# Patient Record
Sex: FEMALE | Race: BLACK OR AFRICAN AMERICAN | NOT HISPANIC OR LATINO
[De-identification: names, ages, dates, MRNs, and addresses within clinical notes are randomized per-mention and may not be internally consistent; named-entity substitution may affect disease eponyms.]

---

## 2019-05-16 ENCOUNTER — APPOINTMENT (OUTPATIENT)
Dept: ORTHOPEDIC SURGERY | Facility: CLINIC | Age: 49
End: 2019-05-16
Payer: COMMERCIAL

## 2019-05-16 VITALS — WEIGHT: 145 LBS | HEIGHT: 64 IN | BODY MASS INDEX: 24.75 KG/M2

## 2019-05-16 PROBLEM — Z00.00 ENCOUNTER FOR PREVENTIVE HEALTH EXAMINATION: Status: ACTIVE | Noted: 2019-05-16

## 2019-05-16 PROCEDURE — 20611 DRAIN/INJ JOINT/BURSA W/US: CPT | Mod: LT

## 2019-05-16 PROCEDURE — 99214 OFFICE O/P EST MOD 30 MIN: CPT | Mod: 25

## 2019-05-16 PROCEDURE — 76882 US LMTD JT/FCL EVL NVASC XTR: CPT | Mod: LT,59

## 2019-05-16 NOTE — ASSESSMENT
[FreeTextEntry1] : POST INJECTION INSTRUCTIONS\par \par COLD THERAPY , ANALGESICS PRN\par \par HOME STRETCHING AND EXERCISES QD\par \par MRI IF NO RELIEF\par

## 2019-05-16 NOTE — PHYSICAL EXAM
[de-identified] : PHYSICAL EXAM LEFT  SHOULDER\par \par SCAPULAR PROTRACTION\par AROM 140 / 140 / 80 / 20 \par TENDER: SA REGION /\par \par SPECIAL TESTING :\par PERAZA - POSITIVE \par DARYL - POSITIVE \par SPEED TEST - POSITIVE\par \par PALACIOS - NEGATIVE \par APPREHENSION AND SUPPRESSION - NEGATIVE \par \par RC STRENGTH TESTING \par SS:  5/5\par SUB 5/5\par IS     5/5\par BICEPS  5/5\par \par SENSATION  - GROSSLY INTACT\par \par \par

## 2019-05-16 NOTE — DISCUSSION/SUMMARY
[de-identified] : Patient has a flare up of left subacromial and subdeltoid pain recently because of change in anterior level and other stresses\par \par Previous injection August 2018 provided excellent relief for prolonged period of time\par \par Triamcinolone 10 Mg Injected in Subacromial Space the Left Shoulder Utilizing Ultrasound Guidance\par \par I advised the patient to resume home exercise program scapular retraction exercises and external rotation strengthening\par \par If Symptoms fail to resolve consider MRI

## 2019-05-16 NOTE — HISTORY OF PRESENT ILLNESS
[Pain Location] : pain [] : left shoulder [8] : an average pain level of 8/10 [Stable] : stable [Lifting] : worsened by lifting [Rest] : relieved by rest [de-identified] : FOLLOW UP\par LEFT SHOULDER PAIN\par IMPROVING\par FLARE UP 1-2 WEEKS AGO\par PAIN LEVEL 8/10\par INTERMITTENT PAIN\par WORSE WITH LIFTING, REACHING, NIGHTTIME\par BETTER WITH CORTISONE INJECTION\par STIFFNESS\par RADIATING PAIN

## 2019-06-13 ENCOUNTER — APPOINTMENT (OUTPATIENT)
Dept: ORTHOPEDIC SURGERY | Facility: CLINIC | Age: 49
End: 2019-06-13
Payer: COMMERCIAL

## 2019-06-13 VITALS — WEIGHT: 145 LBS | BODY MASS INDEX: 24.75 KG/M2 | HEIGHT: 64 IN

## 2019-06-13 PROCEDURE — 99214 OFFICE O/P EST MOD 30 MIN: CPT

## 2019-06-13 NOTE — HISTORY OF PRESENT ILLNESS
[de-identified] : PATIENT DESCRIBES TIGHTNESS AND SORE FEELING DEEP IN GLUTE AREA TRAVELING DOWN INTO HAMSTRING. Right-sided with no history of injury.

## 2019-06-13 NOTE — DISCUSSION/SUMMARY
[de-identified] : The patient clinically he seems to have been aggravated/strain of her hamstring origin. She was placed on Naprosyn 500 mg twice a day for 6 days combined with Pepcid to followup in a week if no improvement. The pain persists MRI that may be warranted.

## 2019-06-13 NOTE — PHYSICAL EXAM
[de-identified] : Patient has point tenderness at the origin of her hamstring and the right groin and buttock. Full range of motion of the right hip is noted with no restrictions. There is no atrophy or weakness and right lower extremity. She is neurovascular intact distally.

## 2019-07-19 ENCOUNTER — APPOINTMENT (OUTPATIENT)
Dept: ORTHOPEDIC SURGERY | Facility: CLINIC | Age: 49
End: 2019-07-19
Payer: COMMERCIAL

## 2019-07-19 VITALS — HEIGHT: 64 IN | BODY MASS INDEX: 24.75 KG/M2 | WEIGHT: 145 LBS

## 2019-07-19 DIAGNOSIS — M25.551 PAIN IN RIGHT HIP: ICD-10-CM

## 2019-07-19 DIAGNOSIS — M75.52 BURSITIS OF LEFT SHOULDER: ICD-10-CM

## 2019-07-19 PROCEDURE — 99214 OFFICE O/P EST MOD 30 MIN: CPT

## 2019-07-19 NOTE — HISTORY OF PRESENT ILLNESS
[de-identified] : This patient is a patient of Dr. Pineda. He gave her an injection in early May. She been moving boxes since Memorial Day. The pain is increased since doing that. Her some pain at nighttime. No weakness.

## 2019-07-19 NOTE — DISCUSSION/SUMMARY
[de-identified] : This patient will get an MRI of the right shoulder. I will call her with the results. Most likely she will followup with Dr. Pineda

## 2019-07-19 NOTE — PHYSICAL EXAM
[de-identified] : Left shoulder shows a full range of motion tenderness over the anterior aspect of glenohumeral joint. Positive Neer impingement sign positive Zarco test. No weakness neurovascular exam is normal

## 2019-07-22 ENCOUNTER — TRANSCRIPTION ENCOUNTER (OUTPATIENT)
Age: 49
End: 2019-07-22

## 2019-07-29 ENCOUNTER — APPOINTMENT (OUTPATIENT)
Dept: ORTHOPEDIC SURGERY | Facility: CLINIC | Age: 49
End: 2019-07-29
Payer: COMMERCIAL

## 2019-07-29 PROCEDURE — 99214 OFFICE O/P EST MOD 30 MIN: CPT

## 2019-07-29 NOTE — HISTORY OF PRESENT ILLNESS
[de-identified] : PATIENT FOLLOWING UP FOR RIGHT HIP PAIN AND STIFFNESS - Patient had a recent MRI which shows chronic tendinitis of the hamstring of the right hip

## 2019-07-29 NOTE — PHYSICAL EXAM
[de-identified] : Patient has point tenderness at the origin of her hamstring and the right groin and buttock. Full range of motion of the right hip is noted with no restrictions. There is no atrophy or weakness and right lower extremity. She is neurovascular intact distally.

## 2019-07-29 NOTE — DISCUSSION/SUMMARY
[de-identified] : Patient will be sent twice a week for 5 weeks of physical therapy she'll followup in 5 weeks' time not really improved.

## 2019-08-15 ENCOUNTER — APPOINTMENT (OUTPATIENT)
Dept: ORTHOPEDIC SURGERY | Facility: CLINIC | Age: 49
End: 2019-08-15
Payer: COMMERCIAL

## 2019-08-15 VITALS — HEIGHT: 64 IN | WEIGHT: 145 LBS | BODY MASS INDEX: 24.75 KG/M2

## 2019-08-15 PROCEDURE — 99213 OFFICE O/P EST LOW 20 MIN: CPT

## 2019-08-15 NOTE — PHYSICAL EXAM
[de-identified] : PHYSICAL EXAM LEFT  SHOULDER\par \par SCAPULAR PROTRACTION\par AROM 120 / 110 / 70 / 20\par TENDER: SA REGION \par \par SPECIAL TESTING :\par PERAZA - POSITIVE \par DARYL - POSITIVE \par SPEED TEST - POSITIVE\par \par PALACIOS - NEGATIVE \par APPREHENSION AND SUPPRESSION - NEGATIVE \par \par RC STRENGTH TESTING \par SS:  5/5\par SUB 5/5\par IS     5/5\par BICEPS  5/5\par \par SENSATION  - GROSSLY INTACT\par \par \par

## 2019-08-15 NOTE — HISTORY OF PRESENT ILLNESS
[10] : an average pain level of 10/10 [de-identified] : LEFT SHOULDER\par FOLLOW UP\par PAIN LEVEL 10/10\par WORSE WITH LIFTING, REACHING, NIGHTTIME\par BETTER WITH CORTISONE INJECTION\par STIFFNESS

## 2019-08-15 NOTE — DISCUSSION/SUMMARY
[de-identified] : PATIENT HAS PARTIAL ROTATOR CUFF TEAR WITH EVIDENCE OF IMPINGEMENT.\par I REVIEWED THE MRI CAREFULLY-TEAR APPEARS TO BE A NEAR COMPLETE\par PATIENT HAS BEEN TREATED WITH INJECTIONS AND PHYSICAL THERAPY HAS NEVER HAD LONG-LASTING RELIEF NEW LINE IN FACT MORE RECENTLY PATIENT HAS HAD MUCH WORSENING SYMPTOMS\par \par I RECOMMEND ARTHROSCOPIC EVALUATION SUBACROMIAL DECOMPRESSION AND ACROMIOPLASTY ROTATOR CUFF REPAIR VERSUS REGION THE 10 IMPLANT.\par \par I'VE EXPLAINED TO LIMITATIONS OF EACH PROCEDURE WILL. PATIENT WILL REQUIRE IMMOBILIZATION SLING OR UP TO 6 WEEKS BUT AS PATIENT PHYSICAL THERAPY FOR 12 WEEKS AFTER SLING IMMOBILIZATION IS OVER\par \par PATIENT WISHED TO PROCEED\par \par WE'LL PROCEED WITH AUTHORIZATION SCHEDULING

## 2019-08-27 ENCOUNTER — APPOINTMENT (OUTPATIENT)
Dept: ORTHOPEDIC SURGERY | Facility: AMBULATORY SURGERY CENTER | Age: 49
End: 2019-08-27
Payer: COMMERCIAL

## 2019-08-27 PROCEDURE — 29827 SHO ARTHRS SRG RT8TR CUF RPR: CPT | Mod: LT

## 2019-08-27 PROCEDURE — 29826 SHO ARTHRS SRG DECOMPRESSION: CPT | Mod: LT,59

## 2019-08-27 PROCEDURE — 29823 SHO ARTHRS SRG XTNSV DBRDMT: CPT | Mod: LT,59

## 2019-08-27 PROCEDURE — 29820 SHO ARTHRS SRG PRTL SYNVCT: CPT | Mod: LT,59

## 2019-08-30 ENCOUNTER — APPOINTMENT (OUTPATIENT)
Dept: ORTHOPEDIC SURGERY | Facility: CLINIC | Age: 49
End: 2019-08-30
Payer: COMMERCIAL

## 2019-08-30 VITALS — WEIGHT: 145 LBS | HEIGHT: 64 IN | BODY MASS INDEX: 24.75 KG/M2

## 2019-08-30 DIAGNOSIS — M75.42 IMPINGEMENT SYNDROME OF LEFT SHOULDER: ICD-10-CM

## 2019-08-30 PROCEDURE — 99024 POSTOP FOLLOW-UP VISIT: CPT

## 2019-08-30 PROCEDURE — 73030 X-RAY EXAM OF SHOULDER: CPT | Mod: LT

## 2019-08-30 RX ORDER — OXYCODONE AND ACETAMINOPHEN 7.5; 325 MG/1; MG/1
7.5-325 TABLET ORAL
Qty: 42 | Refills: 0 | Status: ACTIVE | COMMUNITY
Start: 2019-08-15 | End: 1900-01-01

## 2019-08-30 NOTE — HISTORY OF PRESENT ILLNESS
[de-identified] : LEFT SHOULDER \par FOLLOW UP\par POST OP AUGUST 27, 2019 - LEFT DESIREE , REGENETEN PATCH FOR PARTIAL TEAR \par PAIN LEVEL 6/10\par ICE AND MEDICATION AS NEEDED\par SLIGHT SWELLING\par GENERAL WEAKNESS\par SLIGHT NUMBNESS AND TINGLING

## 2019-08-30 NOTE — PHYSICAL EXAM
[de-identified] : POST OP SHOULDER EXAM \par \par PORTALS HEALING WELL - NO ERYTHEMA OR CALOR\par \par AROM  PEND AND EXT ROT \par DISTAL CMS INTACT [de-identified] : LEFT SHOULDER XRAY -  \par SATISFACTORY ACROMIOPLASTY

## 2019-08-30 NOTE — DISCUSSION/SUMMARY
[de-identified] : AUGUST 27, 2019 - LEFT DESIREE , REGENETEN PATCH FOR PARTIAL TEAR \par \par POST OP DESIREE\par \par COLD THERAPY,ANALGESICS AS NEEDED- WEAN NARCOTICS\par \par DISCONTINUE SLING  - DESKTOP WORK ALLOWED\par \par START PENDULUM EXERCISES, AAROM - DEMONSTARTED \par \par RESUME CARDIO 2 WEEKS - NO SHOULDER CHEST WEIGHTS\par \par START PT WITHIMN 7-14 DAY\par OFFICE FU 6 WEEKS\par

## 2019-10-03 ENCOUNTER — APPOINTMENT (OUTPATIENT)
Dept: ORTHOPEDIC SURGERY | Facility: CLINIC | Age: 49
End: 2019-10-03
Payer: COMMERCIAL

## 2019-10-03 VITALS — HEIGHT: 64 IN | BODY MASS INDEX: 24.75 KG/M2 | WEIGHT: 145 LBS

## 2019-10-03 DIAGNOSIS — M75.112 INCOMPLETE ROTATOR CUFF TEAR OR RUPTURE OF LEFT SHOULDER, NOT SPECIFIED AS TRAUMATIC: ICD-10-CM

## 2019-10-03 PROCEDURE — 99024 POSTOP FOLLOW-UP VISIT: CPT

## 2019-10-03 RX ORDER — NAPROXEN AND ESOMEPRAZOLE MAGNESIUM 500; 20 MG/1; MG/1
500-20 TABLET, DELAYED RELEASE ORAL TWICE DAILY
Qty: 60 | Refills: 2 | Status: ACTIVE | COMMUNITY
Start: 2019-10-03 | End: 1900-01-01

## 2019-10-03 NOTE — PHYSICAL EXAM
[de-identified] : POST OP LEFT SHOULDER EXAM - POST OP AUGUST 27, 2019 - LEFT DESIREE , REGENETEN PATCH FOR PARTIAL TEAR \par \par PORTALS HEALING WELL - NO ERYTHEMA OR CALOR\par \par LEFT AROM - 130 / 130 \par DISTAL CMS INTACT

## 2019-10-03 NOTE — HISTORY OF PRESENT ILLNESS
[de-identified] : LEFT SHOULDER \par FOLLOW UP\par POST OP AUGUST 27, 2019 - LEFT DESIREE , REGENETEN PATCH FOR PARTIAL TEAR \par PAIN LEVEL 6-7/10\par WORSE IN THE MORNING\par STIFFNESS, LACK OF ROM\par PT 2X WEEK HELPING\par

## 2019-11-13 ENCOUNTER — APPOINTMENT (OUTPATIENT)
Dept: ORTHOPEDIC SURGERY | Facility: CLINIC | Age: 49
End: 2019-11-13

## 2020-12-11 ENCOUNTER — APPOINTMENT (OUTPATIENT)
Dept: ORTHOPEDIC SURGERY | Facility: CLINIC | Age: 50
End: 2020-12-11
Payer: COMMERCIAL

## 2020-12-11 PROCEDURE — 99072 ADDL SUPL MATRL&STAF TM PHE: CPT

## 2020-12-11 PROCEDURE — 99215 OFFICE O/P EST HI 40 MIN: CPT

## 2020-12-11 PROCEDURE — 73521 X-RAY EXAM HIPS BI 2 VIEWS: CPT

## 2020-12-11 RX ORDER — NAPROXEN AND ESOMEPRAZOLE MAGNESIUM 500; 20 MG/1; MG/1
500-20 TABLET, DELAYED RELEASE ORAL
Qty: 30 | Refills: 0 | Status: ACTIVE | COMMUNITY
Start: 2020-12-11 | End: 1900-01-01

## 2020-12-11 NOTE — HISTORY OF PRESENT ILLNESS
[Worsening] : worsening [___ mths] : [unfilled] month(s) ago [6] : a current pain level of 6/10 [de-identified] : pt presents today with right sided hip pain x few months. patient had similar complaints last year on the other side, stating she using gets IT band tightness. pt is experiecing "giving out" of her leg when she is walking. She also cannot sit cross legged. Pt has been treated with conservative measures such as vimovo and physical therapy. \par Denies any injury, incontinence, weakness

## 2020-12-11 NOTE — REASON FOR VISIT
[Follow-Up Visit] : a follow-up visit for [FreeTextEntry2] : LEFT HIP PAIN AND LOCKING SENSTATION- LOCKING - SENT FOR NEW XRAYS

## 2020-12-11 NOTE — DISCUSSION/SUMMARY
[de-identified] : pt will be prescribed vimovo to her pharamacy which she will take as prescribed. Pt was also shown quad strengthening exercise for both at home and for the gym. we discussed with patient that her symptoms are consistent with lumbar radiculopathy. She also shes a chiropractor for known lumbar issues. Pt will follow up in 6 weeks.

## 2020-12-11 NOTE — PHYSICAL EXAM
[de-identified] : no pain on rotation of the hip BL.\par + left quadracep atrophy and weakness as compared to the left [de-identified] : AP of BL hip show well preserved joint space of the hip\par no abnormalities shown on x ray

## 2021-05-21 ENCOUNTER — APPOINTMENT (OUTPATIENT)
Dept: ORTHOPEDIC SURGERY | Facility: CLINIC | Age: 51
End: 2021-05-21
Payer: COMMERCIAL

## 2021-05-21 PROCEDURE — 99214 OFFICE O/P EST MOD 30 MIN: CPT

## 2021-05-21 PROCEDURE — 99072 ADDL SUPL MATRL&STAF TM PHE: CPT

## 2021-05-21 PROCEDURE — 73521 X-RAY EXAM HIPS BI 2 VIEWS: CPT

## 2021-05-21 RX ORDER — NAPROXEN AND ESOMEPRAZOLE MAGNESIUM 500; 20 MG/1; MG/1
500-20 TABLET, DELAYED RELEASE ORAL
Qty: 30 | Refills: 0 | Status: ACTIVE | COMMUNITY
Start: 2021-05-21 | End: 1900-01-01

## 2021-05-21 RX ORDER — METHYLPREDNISOLONE 4 MG/1
4 TABLET ORAL
Qty: 1 | Refills: 2 | Status: ACTIVE | COMMUNITY
Start: 2021-05-21 | End: 1900-01-01

## 2021-05-21 NOTE — HISTORY OF PRESENT ILLNESS
[de-identified] : Patient returns with recurrent pain in her left hip she was treated in the past for a presumed diagnosis of lumbar radiculopathy but does complain of catching and locking sensation in her left hip. 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 Patient denies a change in bowel or bladder habits or weakness in the left lower extremity.

## 2021-05-21 NOTE — DISCUSSION/SUMMARY
[Medication Risks Reviewed] : Medication risks reviewed [de-identified] : Patient will be placed on a Medrol Dosepak for her presumed diagnosis of left-sided lumbar radiculopathy. As a precaution we will assess him for an MRI to evaluate her left hip for possible labral pathology.

## 2021-05-21 NOTE — REASON FOR VISIT
[Follow-Up Visit] : a follow-up visit for [FreeTextEntry2] : RETURNS FOR LEFT HIP AND GROIN PAIN - STRAINED IT LAST MONTH (APRIL) WHILE PICKING UP HER DAD WHO FELL - SENT FOR NEW XRAYS

## 2021-05-21 NOTE — PHYSICAL EXAM
[de-identified] : Patient has point tenderness at the origin of her hamstring and the right groin and buttock. Full range of motion of the right hip is noted with no restrictions. There is no atrophy or weakness and right lower extremity. She is neurovascular intact distally.

## 2021-06-03 ENCOUNTER — TRANSCRIPTION ENCOUNTER (OUTPATIENT)
Age: 51
End: 2021-06-03

## 2021-06-03 RX ORDER — NAPROXEN AND ESOMEPRAZOLE MAGNESIUM 500; 20 MG/1; MG/1
500-20 TABLET, DELAYED RELEASE ORAL
Qty: 30 | Refills: 0 | Status: ACTIVE | COMMUNITY
Start: 2021-06-03 | End: 1900-01-01

## 2021-06-09 ENCOUNTER — APPOINTMENT (OUTPATIENT)
Dept: ORTHOPEDIC SURGERY | Facility: CLINIC | Age: 51
End: 2021-06-09
Payer: COMMERCIAL

## 2021-06-09 DIAGNOSIS — M70.60 TROCHANTERIC BURSITIS, UNSPECIFIED HIP: ICD-10-CM

## 2021-06-09 DIAGNOSIS — M76.899 OTHER SPECIFIED ENTHESOPATHIES OF UNSPECIFIED LOWER LIMB, EXCLUDING FOOT: ICD-10-CM

## 2021-06-09 PROCEDURE — 99072 ADDL SUPL MATRL&STAF TM PHE: CPT

## 2021-06-09 PROCEDURE — 99213 OFFICE O/P EST LOW 20 MIN: CPT

## 2021-06-09 RX ORDER — NABUMETONE 500 MG/1
500 TABLET, FILM COATED ORAL
Qty: 30 | Refills: 2 | Status: ACTIVE | COMMUNITY
Start: 2021-06-09 | End: 1900-01-01

## 2021-06-09 NOTE — HISTORY OF PRESENT ILLNESS
[de-identified] : Patient is a new patient presenting for evaluation in regards to left hip discomfort.  She states several months of hip discomfort primarily in the groin and lateral aspect of her thigh.  She denies any lumbar back pain.  States seen by another surgeon and some mild improvement with a Medrol Dosepak.  MRI of the left hip is available

## 2021-06-09 NOTE — PHYSICAL EXAM
[de-identified] : Left hip\par \par Constitutional: \par The patient is healthy-appearing and in no apparent distress. \par \par Gait:\par The patient ambulates with a normal gait and no limp.\par \par Cardiovascular System: \par There is capillary refill less than 2 seconds. \par \par Skin: \par There is no skin abnormalities.\par \par Lumbar Spine;\par There is no significance malalignment and no tenderness to the paraspinal musculature.\par \par Left Hips: \par \par Bony Palpation: \par There is no tenderness of the iliac crest.\par There is no tenderness of the ASIS.\par There is no tenderness of the PSIS.\par There is no tenderness of the SI joint.\par There is mild tenderness of the greater trochanter. \par \par Soft Tissue Palpation: \par There is no tenderness of the hip adductors.\par There is no tenderness of the hip abductors.\par There is no tenderness of the hamstrings. \par There is no tenderness of the piriformis. \par There is tenderness of the hip flexors.\par \par Active Range of Motion: \par There is full range of motion at the hip actively and passively. \par \par Special Tests: \par There is a POSITIVE Erica's test.\par There is a POSITIVE Gil test. \par \par Strength: \par There is 5/5 hip flexion, adduction, abduction.  \par \par Psychiatric: \par The patient demonstrates a normal mood and affect and is active and alert. [de-identified] : MRI reveals mild superior acetabular cystic change\par

## 2021-06-09 NOTE — ASSESSMENT
[FreeTextEntry1] : Discussed at length with patient exam consistent with hip flexor tendinitis and a tight IT band this time discussed treatment options.  Patient elects physical therapy as well as home exercises and anti-inflammatory.  If no improvement patient follow up in office\par

## 2022-04-28 ENCOUNTER — APPOINTMENT (OUTPATIENT)
Dept: ORTHOPEDIC SURGERY | Facility: CLINIC | Age: 52
End: 2022-04-28
Payer: COMMERCIAL

## 2022-04-28 DIAGNOSIS — M75.82 OTHER SHOULDER LESIONS, LEFT SHOULDER: ICD-10-CM

## 2022-04-28 PROCEDURE — 20611 DRAIN/INJ JOINT/BURSA W/US: CPT | Mod: 50

## 2022-04-28 PROCEDURE — 99215 OFFICE O/P EST HI 40 MIN: CPT | Mod: 25

## 2022-04-28 PROCEDURE — 73030 X-RAY EXAM OF SHOULDER: CPT | Mod: 50

## 2022-04-28 NOTE — DISCUSSION/SUMMARY
[de-identified] : ULTRASOUND EVALUATION  REVEALS INFLAMMATORY CHANGES WITHOUT SIGNIFICANT TEAR \par PATIENT HAS ELECTED TO PROCEED WITH KENALOG INJECTION SHOULDER \par RISKS AND BENEFITS DISCUSSED - VERBAL CONSENT OBTAINED \par SEE PROCEDURE NOTE\par \par \par POST INJECTION INSTRUCTIONS:\par \par INJECTION THERAPY HANDOUT PROVIDED\par \par COLD THERAPY , ANALGESICS PRN\par \par HOME  EXERCISES QD - T BAND   HANDOUT PROVIDED, REVIEWED AND DEMONSTRATED - REFERRED TO INSTRUCTIONAL VIDEO ON MY WEBSITE\par \par START P.T.  WITHIN 2 WEEKS AFTER INJECTION - 2 X 4 WEEKS \par \par MRI IF NO RELIEF AFTER 2 INJECTIONS AND/OR 12 WEEKS\par

## 2022-04-28 NOTE — HISTORY OF PRESENT ILLNESS
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

## 2022-04-28 NOTE — PROCEDURE
[de-identified] : INJECTION RIGHT AND LEFT SHOULDER SA SPACE\par \par Patient has demonstrated limited relief from NSAIDS, rest, exercises / PT, and after discussion of the risks and benefits, the patient has elected to proceed with an ULTRASOUND GUIDED injection into the RIGHT AND LEFT  SUBACROMIAL  SPACE LATERAL APPROACH \par  \par Confirmed that the patient does not have history of prior adverse reactions, active, infections, or relevant allergies. There was no effusion, erythema, or warmth, and the skin was clear\par \par The skin was sterilized with alcohol. Ethyl Chloride was used as a topical anesthetic. Routine sterile technique. \par The site was injected UTILIZING ULTRASOUND GUIDANCE to confirm appropriate placement of the needle-\par with a mixture of medication and local anesthetic. The injection was completed without complication and a bandage was applied.\par  \par The patient tolerated the procedure well and was given post-injection instructions.Rec: Cold therapy, analgesics, avoid heavy activity.\par MEDICATION: 4cc of 1% xylocaine + 40mg of KENALOG\par

## 2022-04-28 NOTE — PHYSICAL EXAM
[de-identified] : PHYSICAL EXAM LEFT AND RIGHT   SHOULDER\par \par NORMAL POSTURE\par \par AROM \par LEFT  140 / 140 / 90 / 30\par RIGHT   140 / 140 / 90 / 30\par TENDER: SA REGION ANTERIOR AND LATERAL \par \par SPECIAL TESTING :\par PERAZA - POSITIVE \par DARYL - POSITIVE \par SPEED TEST - POSITIVE\par \par PALACIOS - NEGATIVE \par APPREHENSION AND SUPPRESSION - NEGATIVE \par \par RC STRENGTH TESTING \par SS:  5/5\par SUB 5/5\par IS     5/5\par BICEPS  5/5\par \par SENSATION  - GROSSLY INTACT\par \par \par  [de-identified] : RIGHT SHOULDER XRAY (2 VIEWS - AP AND OUTLET) -  \par NO OBVIOUS FRACTURE ,  SEPARATION OR DISLOCATION \par NO SIGNIFICANT OSTEOARTHRITIS,\par TYPE 1B ACROMION \par CSA= 28.4\par \par LEFT SHOULDER XRAY (2 VIEWS - AP AND OUTLET) -  \par NO OBVIOUS FRACTURE , SEPARATION OR DISLOCATION \par NO SIGNIFICANT OSTEOARTHRITIS, \par TYPE 2B ACROMION \par CSA=28.3\par

## 2022-05-04 ENCOUNTER — APPOINTMENT (OUTPATIENT)
Dept: ORTHOPEDIC SURGERY | Facility: CLINIC | Age: 52
End: 2022-05-04
Payer: COMMERCIAL

## 2022-05-04 DIAGNOSIS — M54.16 RADICULOPATHY, LUMBAR REGION: ICD-10-CM

## 2022-05-04 DIAGNOSIS — M54.50 LOW BACK PAIN, UNSPECIFIED: ICD-10-CM

## 2022-05-04 PROCEDURE — 72110 X-RAY EXAM L-2 SPINE 4/>VWS: CPT

## 2022-05-04 PROCEDURE — 99214 OFFICE O/P EST MOD 30 MIN: CPT

## 2022-05-04 RX ORDER — CYCLOBENZAPRINE HYDROCHLORIDE 5 MG/1
5 TABLET, FILM COATED ORAL 3 TIMES DAILY
Qty: 30 | Refills: 1 | Status: ACTIVE | COMMUNITY
Start: 2022-05-04 | End: 1900-01-01

## 2022-05-04 RX ORDER — NABUMETONE 500 MG/1
500 TABLET, FILM COATED ORAL
Qty: 60 | Refills: 1 | Status: ACTIVE | COMMUNITY
Start: 2022-05-04 | End: 1900-01-01

## 2022-05-04 NOTE — PHYSICAL EXAM
[de-identified] : General: No acute distress, conversant, well-nourished.\par Head: Normocephalic, atraumatic\par Neck: trachea midline, FROM\par Heart: normotensive and normal rate and rhythm\par Lungs: No labored breathing\par Skin: No abrasions, no rashes, no edema\par Psych: Alert and oriented to person, place and time\par Extremities: no peripheral edema or digital cyanosis\par Gait: Normal gait. Can perform tandem gait.  \par Vascular: warm and well perfused distally, palpable distal pulses\par \par MSK:\par Lumbar spine:\par No tenderness to palpation.  No step-off, no deformity.\par \par NEURO EXAM:\par Sensation \par Left L2  -  2/2            \par Left L3  -  2/2\par Left L4  -  2/2\par Left L5  -  2/2\par Left S1  -  2/2\par \par Right L2  -  2/2            \par Right L3  -  2/2\par Right L4  -  2/2\par Right L5  -  2/2\par Right S1  -  2/2\par \par Motor: \par Left L2 (hip flexion)                            5/5                \par Left L3 (knee extension)                   5/5                \par Left L4 (ankle dorsiflexion)                 5/5                \par Left L5 (long toe extensor)                5/5                \par Left S1 (ankle plantar flexion)           5/5\par \par Right L2 (hip flexion)                            5/5                \par Right L3 (knee extension)                   5/5                \par Right L4 (ankle dorsiflexion)                 5/5                \par Right L5 (long toe extensor)                5/5                \par Right S1 (ankle plantar flexion)           5/5\par \par Reflexes: Normal and symmetric\par Negative clonus.  Down-going Babinski.   [de-identified] : I ordered radiographs to evaluate the patient's symptoms.\par \par Lumbar 4 view radiographs taken in the office today show no dislocation or fracture.  Lumbar spondylosis.  No instability on dynamic series.

## 2022-05-04 NOTE — ASSESSMENT
[FreeTextEntry1] : 51 year old female presents with acute exacerbation of chronic back pain radiating into her bilateral hips.  She denies injury.  She has episodes of pain which cause her legs to give out.  She is otherwise neurologically intact.  She will be sent for a lumbar MRI. She was given prescriptions for nabumetone and cyclobenzaprine. She was given a referral for PT. She will followup after her MRI. We discussed red flag symptoms that would require emergent evaluation. She knows to call with any questions or concerns or if her symptoms acutely worsen.

## 2022-05-04 NOTE — HISTORY OF PRESENT ILLNESS
[de-identified] : 51 year old female presents with acute exacerbation of chronic back pain radiating into her bilateral hips.  She denies injury. She denies recent illness, fevers, numbness, balance problems, saddle anesthesia, urinary retention or fecal incontinence.  She has episodes of pain which cause her legs to give out.  She has been to urgent care twice for this issue (November 2021 and January 2022).  She has been to see a chiropractor without relief.

## 2022-09-22 RX ORDER — NAPROXEN AND ESOMEPRAZOLE MAGNESIUM 500; 20 MG/1; MG/1
500-20 TABLET, DELAYED RELEASE ORAL
Qty: 60 | Refills: 2 | Status: ACTIVE | COMMUNITY
Start: 2022-04-28 | End: 1900-01-01

## 2023-04-25 ENCOUNTER — NON-APPOINTMENT (OUTPATIENT)
Age: 53
End: 2023-04-25

## 2023-04-28 ENCOUNTER — APPOINTMENT (OUTPATIENT)
Dept: ORTHOPEDIC SURGERY | Facility: CLINIC | Age: 53
End: 2023-04-28
Payer: COMMERCIAL

## 2023-04-28 PROCEDURE — 73521 X-RAY EXAM HIPS BI 2 VIEWS: CPT

## 2023-04-28 PROCEDURE — 99214 OFFICE O/P EST MOD 30 MIN: CPT

## 2023-04-28 NOTE — REASON FOR VISIT
[Follow-Up Visit] : a follow-up visit for [Hip Pain] : hip pain [FreeTextEntry2] : CHITO hip pain.The pain is intermittent. she has had this pain for while. Pt states she is seeing a pain management doctor who recommends not doing a cortisone for her due to the possibility of bursitis. She states the pain feels like a sharp pain, and sometimes can throw her off balance. she does exercises to help at home , mobic gives no relief

## 2023-04-28 NOTE — PHYSICAL EXAM
[de-identified] : Left hip has full passive internal/external rotation with no restriction pain or block. [de-identified] : Hip radiographs were ordered today AP and lateral both hips were obtained there is some modest narrowing of the cartilage height superiorly in the left hip with some early secondary cyst formation in the acetabulum.  The narrowing of the joint space is increased when compared to radiographs taken approximate 2 years ago.

## 2023-04-28 NOTE — HISTORY OF PRESENT ILLNESS
[de-identified] : Patient returns today with worsening left hip pain.  Patient states that she is having increasing discomfort occasional catching sensation and occasional pop in that hip.  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

## 2023-04-28 NOTE — DISCUSSION/SUMMARY
[de-identified] : Patient's clinical exam and history have to potential underlying etiologies.  Patient has moderate narrowing of her left hip space on today's radiographs when compared to prior radiographs.  She also has clinical exam and history that is consistent with possible labral pathology.  Due to the fact that the pain is worsening and not improving with conservative measures should be sent for an MRI to help evaluate both size and location of a labral tear as well as to assess the cartilage.\par \par Today's consultation lasted 35 minutes

## 2023-05-01 ENCOUNTER — APPOINTMENT (OUTPATIENT)
Dept: ORTHOPEDIC SURGERY | Facility: CLINIC | Age: 53
End: 2023-05-01
Payer: COMMERCIAL

## 2023-05-01 PROCEDURE — 99213 OFFICE O/P EST LOW 20 MIN: CPT | Mod: 25

## 2023-05-01 PROCEDURE — 20611 DRAIN/INJ JOINT/BURSA W/US: CPT | Mod: RT

## 2023-05-01 NOTE — DISCUSSION/SUMMARY
[de-identified] : ULTRASOUND EVALUATION  REVEALS INFLAMMATORY CHANGES WITHOUT SIGNIFICANT TEAR \par PATIENT HAS ELECTED TO PROCEED WITH KENALOG INJECTION SHOULDER \par RISKS AND BENEFITS DISCUSSED - VERBAL CONSENT OBTAINED \par SEE PROCEDURE NOTE\par \par \par POST INJECTION INSTRUCTIONS:\par \par INJECTION THERAPY HANDOUT PROVIDED\par \par COLD THERAPY , ANALGESICS PRN\par \par HOME  EXERCISES QD - PENDULUM AND ROM  HANDOUT PROVIDED, REVIEWED AND DEMONSTRATED - REFERRED TO INSTRUCTIONAL VIDEO ON MY WEBSITE\par \par MRI RIGHT SHOULDER

## 2023-05-01 NOTE — PROCEDURE
[de-identified] : INJECTION RIGHT AND LEFT SHOULDER SA SPACE\par \par Patient has demonstrated limited relief from NSAIDS, rest, exercises / PT, and after discussion of the risks and benefits, the patient has elected to proceed with an ULTRASOUND GUIDED injection into the RIGHT AND LEFT  SUBACROMIAL  SPACE LATERAL APPROACH \par  \par Confirmed that the patient does not have history of prior adverse reactions, active, infections, or relevant allergies. There was no effusion, erythema, or warmth, and the skin was clear\par \par The skin was sterilized with alcohol. Ethyl Chloride was used as a topical anesthetic. Routine sterile technique. \par The site was injected UTILIZING ULTRASOUND GUIDANCE to confirm appropriate placement of the needle-\par with a mixture of medication and local anesthetic. The injection was completed without complication and a bandage was applied.\par  \par The patient tolerated the procedure well and was given post-injection instructions.Rec: Cold therapy, analgesics, avoid heavy activity.\par MEDICATION: 4cc of 1% xylocaine + 40mg of KENALOG\par

## 2023-05-01 NOTE — PHYSICAL EXAM
[de-identified] : PHYSICAL EXAM LEFT AND RIGHT   SHOULDER\par \par NORMAL POSTURE\par \par AROM \par LEFT  140 / 140 / 90 / 30\par RIGHT  130 / 140 / 80 / 5 \par TENDER: SA REGION ANTERIOR AND LATERAL \par \par SPECIAL TESTING :\par PERAZA - POSITIVE \par DARYL - POSITIVE \par SPEED TEST - POSITIVE\par \par PALACIOS - NEGATIVE \par APPREHENSION AND SUPPRESSION - NEGATIVE \par \par RC STRENGTH TESTING \par SS:  5/5\par SUB 5/5\par IS     5/5\par BICEPS  5/5\par \par SENSATION  - GROSSLY INTACT\par \par \par

## 2023-05-01 NOTE — HISTORY OF PRESENT ILLNESS
[de-identified] : BILATERAL SHOULDER PAIN (RIGHT SHOULDER PAIN TODAY) \par FLARED UP 2 MONTHS AGO \par PAIN LEVEL: 7/10 \par APRIL 28, 2023- CORTISONE INJECTION-HELPFUL \par POST OP AUGUST 27, 2019 - LEFT DESIREE , REGENETEN PATCH FOR PARTIAL TEAR \par \par

## 2023-09-06 ENCOUNTER — APPOINTMENT (OUTPATIENT)
Dept: ORTHOPEDIC SURGERY | Facility: CLINIC | Age: 53
End: 2023-09-06
Payer: COMMERCIAL

## 2023-09-06 PROCEDURE — 20611 DRAIN/INJ JOINT/BURSA W/US: CPT | Mod: RT

## 2023-09-06 PROCEDURE — 99213 OFFICE O/P EST LOW 20 MIN: CPT | Mod: 25

## 2023-09-06 NOTE — HISTORY OF PRESENT ILLNESS
[de-identified] : BILATERAL SHOULDER PAIN (RIGHT SHOULDER PAIN TODAY)  FLARED UP 2 WEEKS AGO  PAIN LEVEL:  8/10  MAY 1, 2023- CORTISONE INJ-HELPFUL TEMPORARILY APRIL 28, 2023- CORTISONE INJECTION-HELPFUL TEMPORARILY  POST OP AUGUST 27, 2019 - LEFT DESIREE, REGENETEN PATCH FOR PARTIAL TEAR

## 2023-09-06 NOTE — PROCEDURE
[de-identified] : INJECTION RIGHT SHOULDER SA SPACE  Patient has demonstrated limited relief from NSAIDS, rest, exercises / PT, and after discussion of the risks and benefits, the patient has elected to proceed with an ULTRASOUND GUIDED injection into the RIGHT SUBACROMIAL  SPACE LATERAL APPROACH    Confirmed that the patient does not have history of prior adverse reactions, active, infections, or relevant allergies. There was no effusion, erythema, or warmth, and the skin was clear  The skin was sterilized with alcohol. Ethyl Chloride was used as a topical anesthetic. Routine sterile technique.  The site was injected UTILIZING ULTRASOUND GUIDANCE to confirm appropriate placement of the needle- with a mixture of medication and local anesthetic. The injection was completed without complication and a bandage was applied.   The patient tolerated the procedure well and was given post-injection instructions.Rec: Cold therapy, analgesics, avoid heavy activity. MEDICATION: 4cc of 1% xylocaine + 40mg of KENALOG LOT # [id number]  EXP 07/24

## 2023-09-06 NOTE — PHYSICAL EXAM
[de-identified] : PHYSICAL EXAM LEFT AND RIGHT   SHOULDER  NORMAL POSTURE  AROM  LEFT  140 / 140 / 90 / 30 RIGHT  145 / 145 /  90 / 20  TENDER: SA REGION ANTERIOR AND LATERAL   SPECIAL TESTING : PERAZA - POSITIVE  DARYL - POSITIVE  SPEED TEST - POSITIVE  PALACIOS - NEGATIVE  APPREHENSION AND SUPPRESSION - NEGATIVE   RC STRENGTH TESTING  SS:  5/5 SUB 5/5 IS     5/5 BICEPS  5/5  SENSATION  - GROSSLY INTACT

## 2023-09-06 NOTE — DISCUSSION/SUMMARY
[de-identified] : ULTRASOUND EVALUATION  REVEALS INFLAMMATORY CHANGES WITHOUT SIGNIFICANT TEAR  PATIENT HAS ELECTED TO PROCEED WITH KENALOG INJECTION SHOULDER  RISKS AND BENEFITS DISCUSSED - VERBAL CONSENT OBTAINED  SEE PROCEDURE NOTE   POST INJECTION INSTRUCTIONS:  INJECTION THERAPY HANDOUT PROVIDED  COLD THERAPY , ANALGESICS PRN  HOME  EXERCISES QD - PENDULUM AND ROM  HANDOUT PROVIDED, REVIEWED AND DEMONSTRATED - REFERRED TO INSTRUCTIONAL VIDEO ON MY WEBSITE  MRI ORDERED   CONSIDER   P.T.  WITHIN 2 WEEKS AFTER INJECTION - 2 X 4 WEEKS

## 2024-01-26 ENCOUNTER — APPOINTMENT (OUTPATIENT)
Dept: ORTHOPEDIC SURGERY | Facility: CLINIC | Age: 54
End: 2024-01-26
Payer: COMMERCIAL

## 2024-01-26 DIAGNOSIS — M75.81 OTHER SHOULDER LESIONS, RIGHT SHOULDER: ICD-10-CM

## 2024-01-26 PROCEDURE — 99213 OFFICE O/P EST LOW 20 MIN: CPT | Mod: 25

## 2024-01-26 PROCEDURE — 20611 DRAIN/INJ JOINT/BURSA W/US: CPT | Mod: RT

## 2024-01-26 NOTE — PROCEDURE
[de-identified] : INJECTION RIGHT SHOULDER SA SPACE  Patient has demonstrated limited relief from NSAIDS, rest, exercises / PT, and after discussion of the risks and benefits, the patient has elected to proceed with an ULTRASOUND GUIDED injection into the RIGHT SUBACROMIAL  SPACE LATERAL APPROACH    Confirmed that the patient does not have history of prior adverse reactions, active, infections, or relevant allergies. There was no effusion, erythema, or warmth, and the skin was clear  The skin was sterilized with alcohol. Ethyl Chloride was used as a topical anesthetic. Routine sterile technique.  The site was injected UTILIZING ULTRASOUND GUIDANCE to confirm appropriate placement of the needle- with a mixture of medication and local anesthetic. The injection was completed without complication and a bandage was applied.   The patient tolerated the procedure well and was given post-injection instructions.Rec: Cold therapy, analgesics, avoid heavy activity. MEDICATION: 4cc of 1% xylocaine + 40mg of KENALOG LOT # [id number]  EXP 07/24

## 2024-01-26 NOTE — PHYSICAL EXAM
[de-identified] : PHYSICAL EXAM LEFT AND RIGHT   SHOULDER  NORMAL POSTURE  AROM  LEFT  140 / 140 / 90 / 30 RIGHT  145 / 145 /  90 / 20  TENDER: SA REGION ANTERIOR AND LATERAL   SPECIAL TESTING : PERAZA - POSITIVE  DARYL - POSITIVE  SPEED TEST - POSITIVE  PALACIOS - NEGATIVE  APPREHENSION AND SUPPRESSION - NEGATIVE   RC STRENGTH TESTING  SS:  5/5 SUB 5/5 IS     5/5 BICEPS  5/5  SENSATION  - GROSSLY INTACT

## 2024-01-26 NOTE — HISTORY OF PRESENT ILLNESS
[de-identified] : BILATERAL SHOULDER PAIN ( RIGHT SHOULDER PAIN)  FLARED UP DECEMBER 2023 PAIN LEVEL: 8/10  SEPTEMBER 6, 2023- PREVIOUS COTISONE INJECTION-HELPFUL MAY 1, 2023- CORTISONE INJ-HELPFUL TEMPORARILY APRIL 28, 2023- CORTISONE INJECTION-HELPFUL TEMPORARILY      PREVIOUS HPI BILATERAL SHOULDER PAIN (RIGHT SHOULDER PAIN TODAY)  FLARED UP 2 WEEKS AGO  PAIN LEVEL:  8/10  MAY 1, 2023- CORTISONE INJ-HELPFUL TEMPORARILY APRIL 28, 2023- CORTISONE INJECTION-HELPFUL TEMPORARILY  POST OP AUGUST 27, 2019 - LEFT DESIREE, REGENETEN PATCH FOR PARTIAL TEAR

## 2024-01-26 NOTE — DISCUSSION/SUMMARY
[de-identified] : ULTRASOUND EVALUATION  REVEALS INFLAMMATORY CHANGES WITHOUT SIGNIFICANT TEAR  PATIENT HAS ELECTED TO PROCEED WITH KENALOG INJECTION SHOULDER  RISKS AND BENEFITS DISCUSSED - VERBAL CONSENT OBTAINED  SEE PROCEDURE NOTE   POST INJECTION INSTRUCTIONS:  INJECTION THERAPY HANDOUT PROVIDED  COLD THERAPY , ANALGESICS PRN  HOME  EXERCISES QD - PENDULUM AND ROM  HANDOUT PROVIDED, REVIEWED AND DEMONSTRATED - REFERRED TO INSTRUCTIONAL VIDEO ON MY WEBSITE  MRI ORDERED   CONSIDER   P.T.  WITHIN 2 WEEKS AFTER INJECTION - 2 X 4 WEEKS

## 2024-01-29 ENCOUNTER — APPOINTMENT (OUTPATIENT)
Dept: ORTHOPEDIC SURGERY | Facility: CLINIC | Age: 54
End: 2024-01-29
Payer: COMMERCIAL

## 2024-01-29 DIAGNOSIS — M25.552 PAIN IN LEFT HIP: ICD-10-CM

## 2024-01-29 PROCEDURE — 99213 OFFICE O/P EST LOW 20 MIN: CPT

## 2024-01-29 NOTE — DISCUSSION/SUMMARY
[de-identified] : Patient I reviewed the MRI findings of her left hip.  I indicated the patient she has mild early degenerative changes but is not a candidate for any significant surgical intervention such as a hip replacement surgery at this point.  She has done well with intermittent Vimovo use I encouraged her to continue use of the Vimovo also try to lose some weight stay active continue stretching follow-up as needed.  This consultation lasted 25 minutes

## 2024-01-29 NOTE — PHYSICAL EXAM
[de-identified] : Left hip has full passive internal/external rotation with no restriction pain or block.Neurovascular intact distally.

## 2024-01-29 NOTE — HISTORY OF PRESENT ILLNESS
[de-identified] : Patient returns today to have her left hip evaluated she is seen most recently in April of last year patient had an MRI performed of May last year the results indicate some degenerative mild early changes in the left hip no significant labral pathology.  Patient had some significant family issues that prevented her from returning sooner.  Patient states she has intermittent pain in the left hip it is never severe she does her stretches to help reduce her discomfort takes over-the-counter anti-inflammatory such as Vimovo as needed.

## 2024-01-29 NOTE — REASON FOR VISIT
03/17/21 1000   Activity/Group Checklist   Group   ([employer] and [employer])   Attendance Attended   Attendance Duration (min) 16-30   Interactions Disorganized interaction   Affect/Mood Wide   Goals Achieved Identified feelings; Able to listen to others; Able to engage in interactions [Follow-Up Visit] : a follow-up visit for [Hip Pain] : hip pain [FreeTextEntry2] : LEFT HIP PAIN. REVIEW MRI

## 2024-06-10 ENCOUNTER — APPOINTMENT (OUTPATIENT)
Dept: ORTHOPEDIC SURGERY | Facility: CLINIC | Age: 54
End: 2024-06-10
Payer: COMMERCIAL

## 2024-06-10 DIAGNOSIS — M75.111 INCOMPLETE ROTATOR CUFF TEAR OR RUPTURE OF RIGHT SHOULDER, NOT SPECIFIED AS TRAUMATIC: ICD-10-CM

## 2024-06-10 PROCEDURE — 20611 DRAIN/INJ JOINT/BURSA W/US: CPT | Mod: RT

## 2024-06-10 PROCEDURE — 99213 OFFICE O/P EST LOW 20 MIN: CPT | Mod: 25

## 2024-06-10 NOTE — HISTORY OF PRESENT ILLNESS
[de-identified] : RIGHT SHOULDER PAIN  FLARED UP  1 MONTH AGO  PREVIOUS CORTISONE INJECTION: JAN 26, 2024  PAIN LEVEL: 7/10 PT DID NOT DO PHYSICAL THERAPY     BILATERAL SHOULDER PAIN ( RIGHT SHOULDER PAIN)  FLARED UP DECEMBER 2023 PAIN LEVEL: 8/10  SEPTEMBER 6, 2023- PREVIOUS COTISONE INJECTION-HELPFUL MAY 1, 2023- CORTISONE INJ-HELPFUL TEMPORARILY APRIL 28, 2023- CORTISONE INJECTION-HELPFUL TEMPORARILY      PREVIOUS HPI BILATERAL SHOULDER PAIN (RIGHT SHOULDER PAIN TODAY)  FLARED UP 2 WEEKS AGO  PAIN LEVEL:  8/10  MAY 1, 2023- CORTISONE INJ-HELPFUL TEMPORARILY APRIL 28, 2023- CORTISONE INJECTION-HELPFUL TEMPORARILY  POST OP AUGUST 27, 2019 - LEFT DESIREE, REGENETEN PATCH FOR PARTIAL TEAR

## 2024-06-10 NOTE — PROCEDURE
[de-identified] : INJECTION RIGHT SHOULDER SA SPACE  Patient has demonstrated limited relief from NSAIDS, rest, exercises / PT, and after discussion of the risks and benefits, the patient has elected to proceed with an ULTRASOUND GUIDED injection into the RIGHT SUBACROMIAL  SPACE LATERAL APPROACH    Confirmed that the patient does not have history of prior adverse reactions, active, infections, or relevant allergies. There was no effusion, erythema, or warmth, and the skin was clear  The skin was sterilized with alcohol. Ethyl Chloride was used as a topical anesthetic. Routine sterile technique.  The site was injected UTILIZING ULTRASOUND GUIDANCE to confirm appropriate placement of the needle- with a mixture of medication and local anesthetic. The injection was completed without complication and a bandage was applied.   The patient tolerated the procedure well and was given post-injection instructions.Rec: Cold therapy, analgesics, avoid heavy activity. MEDICATION: 4cc of 1% xylocaine + KETOROLAC 60mg LOT:[id number] EXPIRATION:06/2025

## 2024-06-10 NOTE — DISCUSSION/SUMMARY
[de-identified] :  PATIENT HAS ELECTED TO PROCEED WITH KETOROLAC INJECTION SHOULDER  RISKS AND BENEFITS DISCUSSED - VERBAL CONSENT OBTAINED  SEE PROCEDURE NOTE   POST INJECTION INSTRUCTIONS:  INJECTION THERAPY HANDOUT PROVIDED  COLD THERAPY , ANALGESICS PRN START P.T.  WITHIN 2 WEEKS AFTER INJECTION - 2 X 4 WEEKS   NO MORE INJECTIONS - NEEDS REPAIR

## 2024-07-10 ENCOUNTER — APPOINTMENT (OUTPATIENT)
Dept: ORTHOPEDIC SURGERY | Facility: CLINIC | Age: 54
End: 2024-07-10
Payer: COMMERCIAL

## 2024-07-10 DIAGNOSIS — M75.111 INCOMPLETE ROTATOR CUFF TEAR OR RUPTURE OF RIGHT SHOULDER, NOT SPECIFIED AS TRAUMATIC: ICD-10-CM

## 2024-07-10 PROCEDURE — 99213 OFFICE O/P EST LOW 20 MIN: CPT

## 2024-07-10 RX ORDER — ONDANSETRON 8 MG/1
8 TABLET, ORALLY DISINTEGRATING ORAL 3 TIMES DAILY
Qty: 15 | Refills: 4 | Status: ACTIVE | COMMUNITY
Start: 2024-07-10 | End: 1900-01-01

## 2024-07-10 RX ORDER — OXYCODONE AND ACETAMINOPHEN 7.5; 325 MG/1; MG/1
7.5-325 TABLET ORAL
Qty: 42 | Refills: 0 | Status: ACTIVE | COMMUNITY
Start: 2024-07-10 | End: 1900-01-01

## 2024-07-16 ENCOUNTER — APPOINTMENT (OUTPATIENT)
Age: 54
End: 2024-07-16

## 2024-07-16 PROCEDURE — 29826 SHO ARTHRS SRG DECOMPRESSION: CPT | Mod: RT

## 2024-07-16 PROCEDURE — 29820 SHO ARTHRS SRG PRTL SYNVCT: CPT | Mod: RT,59

## 2024-07-16 PROCEDURE — 29823 SHO ARTHRS SRG XTNSV DBRDMT: CPT | Mod: RT,59

## 2024-07-17 ENCOUNTER — NON-APPOINTMENT (OUTPATIENT)
Age: 54
End: 2024-07-17

## 2024-07-19 ENCOUNTER — APPOINTMENT (OUTPATIENT)
Dept: ORTHOPEDIC SURGERY | Facility: CLINIC | Age: 54
End: 2024-07-19
Payer: COMMERCIAL

## 2024-07-19 DIAGNOSIS — M75.121 COMPLETE ROTATOR CUFF TEAR OR RUPTURE OF RIGHT SHOULDER, NOT SPECIFIED AS TRAUMATIC: ICD-10-CM

## 2024-07-19 PROCEDURE — 99024 POSTOP FOLLOW-UP VISIT: CPT

## 2024-07-19 PROCEDURE — 73030 X-RAY EXAM OF SHOULDER: CPT | Mod: RT

## 2024-07-19 RX ORDER — HYDROMORPHONE HYDROCHLORIDE 4 MG/1
4 TABLET ORAL
Qty: 28 | Refills: 0 | Status: ACTIVE | COMMUNITY
Start: 2024-07-19 | End: 1900-01-01

## 2024-08-07 ENCOUNTER — APPOINTMENT (OUTPATIENT)
Dept: ORTHOPEDIC SURGERY | Facility: CLINIC | Age: 54
End: 2024-08-07

## 2024-08-07 PROCEDURE — 99024 POSTOP FOLLOW-UP VISIT: CPT

## 2024-08-07 NOTE — PHYSICAL EXAM
[de-identified] : POST OP SHOULDER EXAM  JULY 16, 2024- RIGHT 1 CM RC REPAIR, DESIREE  PORTALS HEALING WELL - NO ERYTHEMA OR CALOR  AAROM FLEXION  90  DISTAL CMS INTACT

## 2024-08-07 NOTE — HISTORY OF PRESENT ILLNESS
[de-identified] : RIGHT SHOULDER PAIN JULY 16, 2024- RIGHT 1 CM RC REPAIR, DESIREE PAIN LEVEL: 7-9/10 NOT DOING PHYSCIAL THERAPY YET DOING EXERCISES AT HOME  PREVIOUS HPI RIGHT SHOULDER PAIN FOLLOW UP POST OP - 3 DAYS PAIN LEVEL: 7/10 JULY 16, 2024- RIGHT 1 CM RC REPAIR , DESIREE

## 2024-08-07 NOTE — DISCUSSION/SUMMARY
[de-identified] : POST OP REPAIR: JULY 16, 2024- RIGHT 1 CM RC REPAIR, DESIREE  COLD THERAPY,ANALGESICS AS NEEDED  SHOULDER IMMOBILIZED FULL TIME X 3 - 6 WEEKS - STRICT NO AROM - DESKTOP WORK ALLOWED  SCAPULAR SQUEEZES / ROLLS, ELBOW, WRIST, HAND AROM TID   START PENDULUM EXERCISES, EXT ROT  1 WEEK POSTOP  START AAROM FLEXION, HANNAH  4 WEEKS POST OP  4 WEEKS POSTOP  - ADVANCE TO P.T.

## 2024-10-11 ENCOUNTER — APPOINTMENT (OUTPATIENT)
Dept: ORTHOPEDIC SURGERY | Facility: CLINIC | Age: 54
End: 2024-10-11
Payer: COMMERCIAL

## 2024-10-11 DIAGNOSIS — M65.4 RADIAL STYLOID TENOSYNOVITIS [DE QUERVAIN]: ICD-10-CM

## 2024-10-11 DIAGNOSIS — M75.121 COMPLETE ROTATOR CUFF TEAR OR RUPTURE OF RIGHT SHOULDER, NOT SPECIFIED AS TRAUMATIC: ICD-10-CM

## 2024-10-11 PROCEDURE — 99213 OFFICE O/P EST LOW 20 MIN: CPT | Mod: 24

## 2024-12-11 ENCOUNTER — APPOINTMENT (OUTPATIENT)
Dept: ORTHOPEDIC SURGERY | Facility: CLINIC | Age: 54
End: 2024-12-11
Payer: COMMERCIAL

## 2024-12-11 DIAGNOSIS — M75.121 COMPLETE ROTATOR CUFF TEAR OR RUPTURE OF RIGHT SHOULDER, NOT SPECIFIED AS TRAUMATIC: ICD-10-CM

## 2024-12-11 PROCEDURE — 99213 OFFICE O/P EST LOW 20 MIN: CPT

## 2024-12-16 ENCOUNTER — APPOINTMENT (OUTPATIENT)
Dept: ORTHOPEDIC SURGERY | Facility: CLINIC | Age: 54
End: 2024-12-16

## 2025-02-12 ENCOUNTER — APPOINTMENT (OUTPATIENT)
Dept: ORTHOPEDIC SURGERY | Facility: CLINIC | Age: 55
End: 2025-02-12
Payer: COMMERCIAL

## 2025-02-12 DIAGNOSIS — M75.121 COMPLETE ROTATOR CUFF TEAR OR RUPTURE OF RIGHT SHOULDER, NOT SPECIFIED AS TRAUMATIC: ICD-10-CM

## 2025-02-12 PROCEDURE — 99213 OFFICE O/P EST LOW 20 MIN: CPT
